# Patient Record
Sex: FEMALE | Race: WHITE | NOT HISPANIC OR LATINO | URBAN - NONMETROPOLITAN AREA
[De-identification: names, ages, dates, MRNs, and addresses within clinical notes are randomized per-mention and may not be internally consistent; named-entity substitution may affect disease eponyms.]

---

## 2022-02-14 LAB
AGE: 34
ALBUMIN SERPL-MCNC: 4.6 G/DL (ref 3.4–5)
ALBUMIN/GLOB SERPL: 1.6 {RATIO}
ALP SERPL-CCNC: 73 U/L (ref 50–144)
ALT SERPL-CCNC: 11 U/L (ref 1–45)
ANION GAP SERPL CALC-SCNC: 7 MMOL/L (ref 2–13)
AST SERPL-CCNC: 22 U/L (ref 14–36)
BASOPHILS # BLD AUTO: 0.03 10*3/UL (ref 0.01–0.08)
BASOPHILS NFR BLD AUTO: 0.5 % (ref 0.1–1.2)
BILIRUB SERPL-MCNC: 0.34 MG/DL (ref 0–1)
BUN SERPL-MCNC: 11 MG/DL (ref 7–20)
CALCIUM SERPL-MCNC: 9.5 MG/DL (ref 8.4–10.2)
CHLORIDE SERPL-SCNC: 106 MMOL/L (ref 94–110)
CHOLEST SERPL-MCNC: 169 MG/DL (ref 0–200)
CO2 SERPL-SCNC: 27 MMOL/L (ref 21–32)
CREAT SERPL-MCNC: 0.62 MG/DL (ref 0.52–1.04)
CREAT/UREA NIT SERPL: 17.7 (ref 12–20)
EOSINOPHIL # BLD AUTO: 0.21 10*3/UL (ref 0.04–0.36)
EOSINOPHIL NFR BLD AUTO: 3.2 % (ref 0.7–7)
ERYTHROCYTE [DISTWIDTH] IN BLOOD BY AUTOMATED COUNT: 11.1 % (ref 11–14.5)
EST. AVERAGE GLUCOSE BLD GHB EST-MCNC: 84 MG/DL (ref 70–115)
GLOBULIN SER-MCNC: 2.8 G/DL (ref 2–3.9)
GLUCOSE SERPL-MCNC: 83 MG/DL (ref 70–115)
HBA1C MFR BLD: 4.8 % (ref 4–6)
HCT VFR BLD AUTO: 37.1 % (ref 36–48)
HDLC SERPL-MCNC: 52 MG/DL (ref 40–60)
HGB BLD-MCNC: 13.5 G/DL (ref 11.8–16)
IMM GRANULOCYTES # BLD AUTO: 0.05 10*3/UL (ref 0–0.03)
IMM GRANULOCYTES NFR BLD AUTO: 0.8 % (ref 0–0.5)
LDLC SERPL CALC-MCNC: 88.6 MG/DL (ref 30–100)
LYMPHOCYTES # BLD AUTO: 1.86 10*3/UL (ref 1.16–3.74)
LYMPHOCYTES NFR BLD AUTO: 28 % (ref 20–55)
MANUAL DIFF? (OHS): NORMAL
MCH RBC QN AUTO: 32.6 PG (ref 27–34)
MCHC RBC AUTO-ENTMCNC: 36.4 G/DL (ref 31–37)
MCV RBC AUTO: 89.6 FL (ref 79–99)
MONOCYTES # BLD AUTO: 0.41 10*3/UL (ref 0.24–0.36)
MONOCYTES NFR BLD AUTO: 6.2 % (ref 4.7–12.5)
NEUTROPHILS # BLD AUTO: 4.09 10*3/UL (ref 1.56–6.13)
NEUTROPHILS NFR BLD AUTO: 61.3 % (ref 37–73)
PLATELET # BLD AUTO: 142 10*3/UL (ref 140–371)
PMV BLD AUTO: 9.7 FL (ref 9.4–12.4)
POTASSIUM SERPL-SCNC: 4.1 MMOL/L (ref 3.5–5.1)
PROT SERPL-MCNC: 7.4 G/DL (ref 6.3–8.2)
RBC # BLD AUTO: 4.14 10*6/UL (ref 4–5.1)
SODIUM SERPL-SCNC: 140 MMOL/L (ref 135–145)
TRIGL SERPL-MCNC: 89 MG/DL (ref 30–200)
TSH SERPL-ACNC: 1.11 M[IU]/L (ref 0.36–3.74)
WBC # SPEC AUTO: 6.7 10*3/UL (ref 4–11.5)

## 2022-02-18 ENCOUNTER — HISTORICAL (OUTPATIENT)
Dept: ADMINISTRATIVE | Facility: HOSPITAL | Age: 37
End: 2022-02-18

## 2022-05-14 ENCOUNTER — HISTORICAL (OUTPATIENT)
Dept: ADMINISTRATIVE | Facility: HOSPITAL | Age: 37
End: 2022-05-14

## 2023-01-24 DIAGNOSIS — F90.9 ATTENTION DEFICIT HYPERACTIVITY DISORDER (ADHD), UNSPECIFIED ADHD TYPE: Primary | ICD-10-CM

## 2023-01-24 RX ORDER — DEXTROAMPHETAMINE SACCHARATE, AMPHETAMINE ASPARTATE MONOHYDRATE, DEXTROAMPHETAMINE SULFATE AND AMPHETAMINE SULFATE 7.5; 7.5; 7.5; 7.5 MG/1; MG/1; MG/1; MG/1
30 CAPSULE, EXTENDED RELEASE ORAL EVERY MORNING
COMMUNITY
End: 2023-01-24 | Stop reason: SDUPTHER

## 2023-01-24 RX ORDER — DEXTROAMPHETAMINE SACCHARATE, AMPHETAMINE ASPARTATE MONOHYDRATE, DEXTROAMPHETAMINE SULFATE AND AMPHETAMINE SULFATE 7.5; 7.5; 7.5; 7.5 MG/1; MG/1; MG/1; MG/1
30 CAPSULE, EXTENDED RELEASE ORAL EVERY MORNING
Qty: 30 CAPSULE | Refills: 0 | Status: SHIPPED | OUTPATIENT
Start: 2023-01-24 | End: 2023-02-23

## 2023-02-23 DIAGNOSIS — F90.9 ATTENTION DEFICIT HYPERACTIVITY DISORDER (ADHD), UNSPECIFIED ADHD TYPE: Primary | ICD-10-CM

## 2023-02-23 RX ORDER — DEXTROAMPHETAMINE SACCHARATE, AMPHETAMINE ASPARTATE MONOHYDRATE, DEXTROAMPHETAMINE SULFATE AND AMPHETAMINE SULFATE 7.5; 7.5; 7.5; 7.5 MG/1; MG/1; MG/1; MG/1
30 CAPSULE, EXTENDED RELEASE ORAL EVERY MORNING
Qty: 30 CAPSULE | Refills: 0 | Status: SHIPPED | OUTPATIENT
Start: 2023-02-23 | End: 2023-02-28 | Stop reason: SDUPTHER

## 2023-02-23 RX ORDER — DEXTROAMPHETAMINE SACCHARATE, AMPHETAMINE ASPARTATE MONOHYDRATE, DEXTROAMPHETAMINE SULFATE AND AMPHETAMINE SULFATE 7.5; 7.5; 7.5; 7.5 MG/1; MG/1; MG/1; MG/1
30 CAPSULE, EXTENDED RELEASE ORAL EVERY MORNING
Qty: 30 CAPSULE | Refills: 0 | OUTPATIENT
Start: 2023-02-23

## 2023-02-23 RX ORDER — DEXTROAMPHETAMINE SACCHARATE, AMPHETAMINE ASPARTATE MONOHYDRATE, DEXTROAMPHETAMINE SULFATE AND AMPHETAMINE SULFATE 7.5; 7.5; 7.5; 7.5 MG/1; MG/1; MG/1; MG/1
30 CAPSULE, EXTENDED RELEASE ORAL EVERY MORNING
COMMUNITY
Start: 2022-12-22 | End: 2023-02-23 | Stop reason: SDUPTHER

## 2023-02-28 DIAGNOSIS — F90.9 ATTENTION DEFICIT HYPERACTIVITY DISORDER (ADHD), UNSPECIFIED ADHD TYPE: ICD-10-CM

## 2023-02-28 RX ORDER — DEXTROAMPHETAMINE SACCHARATE, AMPHETAMINE ASPARTATE MONOHYDRATE, DEXTROAMPHETAMINE SULFATE AND AMPHETAMINE SULFATE 7.5; 7.5; 7.5; 7.5 MG/1; MG/1; MG/1; MG/1
30 CAPSULE, EXTENDED RELEASE ORAL EVERY MORNING
Qty: 30 CAPSULE | Refills: 0 | Status: SHIPPED | OUTPATIENT
Start: 2023-02-28 | End: 2023-03-30 | Stop reason: SDUPTHER

## 2023-02-28 NOTE — TELEPHONE ENCOUNTER
----- Message from Leena Quick sent at 2/28/2023  9:23 AM CST -----  Patient called stating [facility] does not have Adderall so she cancelled script we sent to them and asked that we send her Adderall XR 30 mg to [facility].

## 2023-03-28 ENCOUNTER — TELEPHONE (OUTPATIENT)
Dept: FAMILY MEDICINE | Facility: CLINIC | Age: 38
End: 2023-03-28
Payer: MEDICAID

## 2023-03-29 NOTE — TELEPHONE ENCOUNTER
Explained to pt that her 3 month f/u for adderall is tomorrow with Sadaf. She will refill it at visit.

## 2023-03-29 NOTE — TELEPHONE ENCOUNTER
----- Message from Destiny Chauhan sent at 3/29/2023  3:18 PM CDT -----  Regarding: Med refill      She said she called for a refill yesterday and apparently it was not called in since the pharmacy does not have her meds ready.  I explained that it can take up to 48 hours to get a refill and depends on the providers schedule.    She wants to refill her Adderall XR  30mg          [facility]

## 2023-03-30 ENCOUNTER — OFFICE VISIT (OUTPATIENT)
Dept: FAMILY MEDICINE | Facility: CLINIC | Age: 38
End: 2023-03-30
Payer: MEDICAID

## 2023-03-30 VITALS
OXYGEN SATURATION: 97 % | BODY MASS INDEX: 39.2 KG/M2 | HEIGHT: 62 IN | HEART RATE: 101 BPM | WEIGHT: 213 LBS | DIASTOLIC BLOOD PRESSURE: 78 MMHG | TEMPERATURE: 98 F | RESPIRATION RATE: 20 BRPM | SYSTOLIC BLOOD PRESSURE: 108 MMHG

## 2023-03-30 DIAGNOSIS — T78.40XD ALLERGY, SUBSEQUENT ENCOUNTER: ICD-10-CM

## 2023-03-30 DIAGNOSIS — F90.9 ATTENTION DEFICIT HYPERACTIVITY DISORDER (ADHD), UNSPECIFIED ADHD TYPE: ICD-10-CM

## 2023-03-30 DIAGNOSIS — K21.9 GASTROESOPHAGEAL REFLUX DISEASE, UNSPECIFIED WHETHER ESOPHAGITIS PRESENT: Primary | ICD-10-CM

## 2023-03-30 DIAGNOSIS — F41.9 ANXIETY: ICD-10-CM

## 2023-03-30 PROCEDURE — 3074F PR MOST RECENT SYSTOLIC BLOOD PRESSURE < 130 MM HG: ICD-10-PCS | Mod: CPTII,,, | Performed by: NURSE PRACTITIONER

## 2023-03-30 PROCEDURE — 1159F MED LIST DOCD IN RCRD: CPT | Mod: CPTII,,, | Performed by: NURSE PRACTITIONER

## 2023-03-30 PROCEDURE — 3008F PR BODY MASS INDEX (BMI) DOCUMENTED: ICD-10-PCS | Mod: CPTII,,, | Performed by: NURSE PRACTITIONER

## 2023-03-30 PROCEDURE — 99213 PR OFFICE/OUTPT VISIT, EST, LEVL III, 20-29 MIN: ICD-10-PCS | Mod: ,,, | Performed by: NURSE PRACTITIONER

## 2023-03-30 PROCEDURE — 3078F DIAST BP <80 MM HG: CPT | Mod: CPTII,,, | Performed by: NURSE PRACTITIONER

## 2023-03-30 PROCEDURE — 3078F PR MOST RECENT DIASTOLIC BLOOD PRESSURE < 80 MM HG: ICD-10-PCS | Mod: CPTII,,, | Performed by: NURSE PRACTITIONER

## 2023-03-30 PROCEDURE — 99213 OFFICE O/P EST LOW 20 MIN: CPT | Mod: ,,, | Performed by: NURSE PRACTITIONER

## 2023-03-30 PROCEDURE — 3074F SYST BP LT 130 MM HG: CPT | Mod: CPTII,,, | Performed by: NURSE PRACTITIONER

## 2023-03-30 PROCEDURE — 3008F BODY MASS INDEX DOCD: CPT | Mod: CPTII,,, | Performed by: NURSE PRACTITIONER

## 2023-03-30 PROCEDURE — 1159F PR MEDICATION LIST DOCUMENTED IN MEDICAL RECORD: ICD-10-PCS | Mod: CPTII,,, | Performed by: NURSE PRACTITIONER

## 2023-03-30 RX ORDER — OMEPRAZOLE 40 MG/1
40 CAPSULE, DELAYED RELEASE ORAL DAILY
COMMUNITY
Start: 2023-02-28 | End: 2023-03-30 | Stop reason: SDUPTHER

## 2023-03-30 RX ORDER — OMEPRAZOLE 40 MG/1
40 CAPSULE, DELAYED RELEASE ORAL DAILY
Qty: 30 CAPSULE | Refills: 2 | Status: SHIPPED | OUTPATIENT
Start: 2023-03-30 | End: 2023-07-05 | Stop reason: SDUPTHER

## 2023-03-30 RX ORDER — CETIRIZINE HYDROCHLORIDE 10 MG/1
10 TABLET ORAL DAILY
Qty: 30 TABLET | Refills: 2 | Status: SHIPPED | OUTPATIENT
Start: 2023-03-30 | End: 2023-04-27

## 2023-03-30 RX ORDER — DEXTROAMPHETAMINE SACCHARATE, AMPHETAMINE ASPARTATE MONOHYDRATE, DEXTROAMPHETAMINE SULFATE AND AMPHETAMINE SULFATE 7.5; 7.5; 7.5; 7.5 MG/1; MG/1; MG/1; MG/1
30 CAPSULE, EXTENDED RELEASE ORAL EVERY MORNING
Qty: 30 CAPSULE | Refills: 0 | Status: SHIPPED | OUTPATIENT
Start: 2023-03-30 | End: 2023-04-27 | Stop reason: SDUPTHER

## 2023-03-30 RX ORDER — BUSPIRONE HYDROCHLORIDE 5 MG/1
5 TABLET ORAL 2 TIMES DAILY
Qty: 60 TABLET | Refills: 2 | Status: SHIPPED | OUTPATIENT
Start: 2023-03-30 | End: 2023-04-27

## 2023-03-30 NOTE — PROGRESS NOTES
SUBJECTIVE:  Melissa Young is a 37 y.o. female here for Follow-up (3 mth f/u on medication. States that medications are working well. Needing refills. States that she stopped Prozac due to medication ineffective./)      HPI  Presents to the clinic in follow-up.  Reports she is feeling good.  Stomach issues have improved since stopping Prozac as she did not feel the medication was helping much.    Melissa's allergies, medications, history, and problem list were updated as appropriate.    Review of Systems   Psychiatric/Behavioral:  Positive for decreased concentration and dysphoric mood.     A comprehensive review of symptoms was completed and negative except as noted above.    Recent Results (from the past 504 hour(s))   Comprehensive Metabolic Panel    Collection Time: 04/03/23  9:26 AM   Result Value Ref Range    Sodium Level 141 135 - 145 mmol/L    Potassium Level 4.2 3.5 - 5.1 mmol/L    Chloride 109 98 - 110 mmol/L    Carbon Dioxide 28 21 - 32 mmol/L    Glucose Level 92 70 - 115 mg/dL    Blood Urea Nitrogen 8.0 7.0 - 20.0 mg/dL    Creatinine 0.64 (L) 0.66 - 1.25 mg/dL    Calcium Level Total 8.8 8.4 - 10.2 mg/dL    Protein Total 6.8 6.3 - 8.2 gm/dL    Albumin Level 3.9 3.4 - 5.0 g/dL    Globulin 2.9 2.0 - 3.9 gm/dL    Albumin/Globulin Ratio 1.3 ratio    Bilirubin Total 0.4 0.0 - 1.0 mg/dL    Alkaline Phosphatase 108 50 - 144 unit/L    Alanine Aminotransferase 58 (H) 1 - 45 unit/L    Aspartate Aminotransferase 37 (H) 14 - 36 unit/L    eGFR >90 mls/min/1.73/m2    Anion Gap 4.0 2.0 - 13.0 mEq/L    BUN/Creatinine Ratio 13 12 - 20   Lipid Panel    Collection Time: 04/03/23  9:26 AM   Result Value Ref Range    Cholesterol Total 197 0 - 200 mg/dL    HDL Cholesterol 48 40 - 60 mg/dL    Triglyceride 135 30 - 200 mg/dL    LDL Cholesterol Direct 110.1 (H) 30.0 - 100.0 mg/dL   Vitamin D    Collection Time: 04/03/23  9:26 AM   Result Value Ref Range    Vit D 25 OH 20.2 (L) 30.0 - 100.0 ng/mL   Vitamin B12    Collection Time:  "04/03/23  9:26 AM   Result Value Ref Range    Vitamin B12 Level 361 211 - 946 pg/mL   TSH    Collection Time: 04/03/23  9:26 AM   Result Value Ref Range    Thyroid Stimulating Hormone 2.180 0.360 - 3.740 uIU/mL   CBC with Differential    Collection Time: 04/03/23  9:26 AM   Result Value Ref Range    WBC 7.0 4.0 - 11.5 x10(3)/mcL    RBC 3.84 (L) 4.00 - 5.10 x10(6)/mcL    Hgb 11.6 (L) 11.8 - 16.0 g/dL    Hct 34.7 (L) 36.0 - 48.0 %    MCV 90.4 79.0 - 99.0 fL    MCH 30.2 27.0 - 34.0 pg    MCHC 33.4 31.0 - 37.0 g/dL    RDW 12.6 11.0 - 14.5 %    Platelet 175 140 - 371 x10(3)/mcL    MPV 9.3 (L) 9.4 - 12.4 fL    IG# 0.07 (H) 0.0001 - 0.031 x10(3)/mcL    IG% 1.0 (H) 0 - 0.5 %   Blood Smear Microscopic Exam    Collection Time: 04/03/23  9:26 AM   Result Value Ref Range    Platelet Est Adequate Normal, Adequate       OBJECTIVE:  Vital signs  Vitals:    03/30/23 1018   BP: 108/78   BP Location: Right arm   Patient Position: Sitting   Pulse: 101   Resp: 20   Temp: 97.7 °F (36.5 °C)   SpO2: 97%   Weight: 96.6 kg (213 lb)   Height: 5' 2" (1.575 m)        Physical Exam  Constitutional:       Appearance: She is obese.   HENT:      Head: Normocephalic and atraumatic.      Nose: Nose normal.      Mouth/Throat:      Mouth: Mucous membranes are moist.      Pharynx: Oropharynx is clear.   Eyes:      Conjunctiva/sclera: Conjunctivae normal.   Cardiovascular:      Rate and Rhythm: Normal rate and regular rhythm.   Pulmonary:      Effort: Pulmonary effort is normal.      Breath sounds: Normal breath sounds.   Abdominal:      General: Bowel sounds are normal.      Palpations: Abdomen is soft.   Musculoskeletal:         General: Normal range of motion.      Cervical back: Normal range of motion and neck supple.   Skin:     General: Skin is warm.   Neurological:      Mental Status: She is alert.   Psychiatric:         Mood and Affect: Mood normal.         Behavior: Behavior normal.         Judgment: Judgment normal.        ASSESSMENT/PLAN:  1. " Gastroesophageal reflux disease, unspecified whether esophagitis present  -     omeprazole (PRILOSEC) 40 MG capsule; Take 1 capsule (40 mg total) by mouth once daily.  Dispense: 30 capsule; Refill: 2    2. Anxiety  -     busPIRone (BUSPAR) 5 MG Tab; Take 1 tablet (5 mg total) by mouth 2 (two) times daily.  Dispense: 60 tablet; Refill: 2    3. Allergy, subsequent encounter  -     cetirizine (ZYRTEC) 10 MG tablet; Take 1 tablet (10 mg total) by mouth once daily.  Dispense: 30 tablet; Refill: 2    4. Attention deficit hyperactivity disorder (ADHD), unspecified ADHD type  -     dextroamphetamine-amphetamine (ADDERALL XR) 30 MG 24 hr capsule; Take 1 capsule (30 mg total) by mouth every morning.  Dispense: 30 capsule; Refill: 0        Follow Up:  Follow up in about 3 months (around 6/30/2023).

## 2023-04-03 PROCEDURE — 80053 COMPREHEN METABOLIC PANEL: CPT | Performed by: NURSE PRACTITIONER

## 2023-04-03 PROCEDURE — 84443 ASSAY THYROID STIM HORMONE: CPT | Performed by: NURSE PRACTITIONER

## 2023-04-03 PROCEDURE — 82607 VITAMIN B-12: CPT | Performed by: NURSE PRACTITIONER

## 2023-04-03 PROCEDURE — 80061 LIPID PANEL: CPT | Performed by: NURSE PRACTITIONER

## 2023-04-03 PROCEDURE — 82306 VITAMIN D 25 HYDROXY: CPT | Performed by: NURSE PRACTITIONER

## 2023-04-03 PROCEDURE — 85025 COMPLETE CBC W/AUTO DIFF WBC: CPT | Performed by: NURSE PRACTITIONER

## 2023-04-27 DIAGNOSIS — F90.9 ATTENTION DEFICIT HYPERACTIVITY DISORDER (ADHD), UNSPECIFIED ADHD TYPE: ICD-10-CM

## 2023-04-27 DIAGNOSIS — T78.40XD ALLERGY, SUBSEQUENT ENCOUNTER: ICD-10-CM

## 2023-04-27 DIAGNOSIS — F41.9 ANXIETY: ICD-10-CM

## 2023-04-27 RX ORDER — DEXTROAMPHETAMINE SACCHARATE, AMPHETAMINE ASPARTATE MONOHYDRATE, DEXTROAMPHETAMINE SULFATE AND AMPHETAMINE SULFATE 7.5; 7.5; 7.5; 7.5 MG/1; MG/1; MG/1; MG/1
30 CAPSULE, EXTENDED RELEASE ORAL EVERY MORNING
Qty: 30 CAPSULE | Refills: 0 | Status: SHIPPED | OUTPATIENT
Start: 2023-04-27 | End: 2023-05-29 | Stop reason: SDUPTHER

## 2023-04-27 RX ORDER — CETIRIZINE HYDROCHLORIDE 10 MG/1
TABLET ORAL
Qty: 30 TABLET | Refills: 2 | Status: SHIPPED | OUTPATIENT
Start: 2023-04-27 | End: 2023-07-05 | Stop reason: SDUPTHER

## 2023-04-27 RX ORDER — BUSPIRONE HYDROCHLORIDE 5 MG/1
TABLET ORAL
Qty: 60 TABLET | Refills: 1 | Status: SHIPPED | OUTPATIENT
Start: 2023-04-27 | End: 2023-07-05 | Stop reason: SDUPTHER

## 2023-04-27 RX ORDER — FLUOXETINE HYDROCHLORIDE 40 MG/1
CAPSULE ORAL
Qty: 30 CAPSULE | Refills: 2 | Status: SHIPPED | OUTPATIENT
Start: 2023-04-27 | End: 2023-07-05 | Stop reason: SDDI

## 2023-05-29 DIAGNOSIS — F90.9 ATTENTION DEFICIT HYPERACTIVITY DISORDER (ADHD), UNSPECIFIED ADHD TYPE: ICD-10-CM

## 2023-05-29 RX ORDER — DEXTROAMPHETAMINE SACCHARATE, AMPHETAMINE ASPARTATE MONOHYDRATE, DEXTROAMPHETAMINE SULFATE AND AMPHETAMINE SULFATE 7.5; 7.5; 7.5; 7.5 MG/1; MG/1; MG/1; MG/1
30 CAPSULE, EXTENDED RELEASE ORAL EVERY MORNING
Qty: 30 CAPSULE | Refills: 0 | Status: SHIPPED | OUTPATIENT
Start: 2023-05-29 | End: 2023-07-05 | Stop reason: SDUPTHER

## 2023-07-05 ENCOUNTER — TELEPHONE (OUTPATIENT)
Dept: OBSTETRICS AND GYNECOLOGY | Facility: CLINIC | Age: 38
End: 2023-07-05
Payer: MEDICAID

## 2023-07-05 ENCOUNTER — OFFICE VISIT (OUTPATIENT)
Dept: FAMILY MEDICINE | Facility: CLINIC | Age: 38
End: 2023-07-05
Payer: MEDICAID

## 2023-07-05 VITALS
TEMPERATURE: 97 F | WEIGHT: 198 LBS | OXYGEN SATURATION: 97 % | DIASTOLIC BLOOD PRESSURE: 70 MMHG | HEART RATE: 90 BPM | SYSTOLIC BLOOD PRESSURE: 118 MMHG | BODY MASS INDEX: 36.44 KG/M2 | HEIGHT: 62 IN | RESPIRATION RATE: 18 BRPM

## 2023-07-05 DIAGNOSIS — T78.40XD ALLERGY, SUBSEQUENT ENCOUNTER: ICD-10-CM

## 2023-07-05 DIAGNOSIS — F90.9 ATTENTION DEFICIT HYPERACTIVITY DISORDER (ADHD), UNSPECIFIED ADHD TYPE: Primary | ICD-10-CM

## 2023-07-05 DIAGNOSIS — F41.9 ANXIETY: ICD-10-CM

## 2023-07-05 DIAGNOSIS — Z01.419 WOMEN'S ANNUAL ROUTINE GYNECOLOGICAL EXAMINATION: ICD-10-CM

## 2023-07-05 DIAGNOSIS — K21.9 GASTROESOPHAGEAL REFLUX DISEASE, UNSPECIFIED WHETHER ESOPHAGITIS PRESENT: ICD-10-CM

## 2023-07-05 PROCEDURE — 3008F BODY MASS INDEX DOCD: CPT | Mod: CPTII,,, | Performed by: NURSE PRACTITIONER

## 2023-07-05 PROCEDURE — 99213 PR OFFICE/OUTPT VISIT, EST, LEVL III, 20-29 MIN: ICD-10-PCS | Mod: ,,, | Performed by: NURSE PRACTITIONER

## 2023-07-05 PROCEDURE — 3078F DIAST BP <80 MM HG: CPT | Mod: CPTII,,, | Performed by: NURSE PRACTITIONER

## 2023-07-05 PROCEDURE — 1159F PR MEDICATION LIST DOCUMENTED IN MEDICAL RECORD: ICD-10-PCS | Mod: CPTII,,, | Performed by: NURSE PRACTITIONER

## 2023-07-05 PROCEDURE — 3008F PR BODY MASS INDEX (BMI) DOCUMENTED: ICD-10-PCS | Mod: CPTII,,, | Performed by: NURSE PRACTITIONER

## 2023-07-05 PROCEDURE — 99213 OFFICE O/P EST LOW 20 MIN: CPT | Mod: ,,, | Performed by: NURSE PRACTITIONER

## 2023-07-05 PROCEDURE — 3074F PR MOST RECENT SYSTOLIC BLOOD PRESSURE < 130 MM HG: ICD-10-PCS | Mod: CPTII,,, | Performed by: NURSE PRACTITIONER

## 2023-07-05 PROCEDURE — 1159F MED LIST DOCD IN RCRD: CPT | Mod: CPTII,,, | Performed by: NURSE PRACTITIONER

## 2023-07-05 PROCEDURE — 3074F SYST BP LT 130 MM HG: CPT | Mod: CPTII,,, | Performed by: NURSE PRACTITIONER

## 2023-07-05 PROCEDURE — 3078F PR MOST RECENT DIASTOLIC BLOOD PRESSURE < 80 MM HG: ICD-10-PCS | Mod: CPTII,,, | Performed by: NURSE PRACTITIONER

## 2023-07-05 RX ORDER — DEXTROAMPHETAMINE SACCHARATE, AMPHETAMINE ASPARTATE MONOHYDRATE, DEXTROAMPHETAMINE SULFATE AND AMPHETAMINE SULFATE 7.5; 7.5; 7.5; 7.5 MG/1; MG/1; MG/1; MG/1
30 CAPSULE, EXTENDED RELEASE ORAL EVERY MORNING
Qty: 30 CAPSULE | Refills: 0 | Status: SHIPPED | OUTPATIENT
Start: 2023-07-05 | End: 2023-08-02 | Stop reason: SDUPTHER

## 2023-07-05 RX ORDER — CETIRIZINE HYDROCHLORIDE 10 MG/1
10 TABLET ORAL NIGHTLY
Qty: 30 TABLET | Refills: 2 | Status: SHIPPED | OUTPATIENT
Start: 2023-07-05 | End: 2023-11-06 | Stop reason: SDUPTHER

## 2023-07-05 RX ORDER — OMEPRAZOLE 40 MG/1
40 CAPSULE, DELAYED RELEASE ORAL DAILY
Qty: 30 CAPSULE | Refills: 2 | Status: SHIPPED | OUTPATIENT
Start: 2023-07-05 | End: 2023-11-06 | Stop reason: SDUPTHER

## 2023-07-05 RX ORDER — HYDROXYZINE HYDROCHLORIDE 25 MG/1
25 TABLET, FILM COATED ORAL 4 TIMES DAILY PRN
COMMUNITY
Start: 2023-01-24 | End: 2023-07-05 | Stop reason: SDDI

## 2023-07-05 RX ORDER — BUSPIRONE HYDROCHLORIDE 5 MG/1
5 TABLET ORAL 2 TIMES DAILY
Qty: 60 TABLET | Refills: 2 | Status: SHIPPED | OUTPATIENT
Start: 2023-07-05 | End: 2023-10-05 | Stop reason: SDUPTHER

## 2023-07-05 NOTE — PROGRESS NOTES
"SUBJECTIVE:  Melissa Young is a 37 y.o. female here for 3 month f/u meds/ADHD      HPI  Presents to the clinic in follow-up.  Reports she is doing well.  Her anxiety and ADHD are under control.  She has been watching her diet and lost 15 lbs since her last visit.  GI issues are stable.    Kamis allergies, medications, history, and problem list were updated as appropriate.    Review of Systems   Psychiatric/Behavioral:  Positive for decreased concentration and dysphoric mood.     A comprehensive review of symptoms was completed and negative except as noted above.    No results found for this or any previous visit (from the past 504 hour(s)).    OBJECTIVE:  Vital signs  Vitals:    07/05/23 1041   BP: 118/70   BP Location: Left arm   Patient Position: Sitting   Pulse: 90   Resp: 18   Temp: 97.2 °F (36.2 °C)   TempSrc: Temporal   SpO2: 97%   Weight: 89.8 kg (198 lb)   Height: 5' 2" (1.575 m)        Physical Exam  Constitutional:       Appearance: Normal appearance.   HENT:      Head: Normocephalic and atraumatic.      Right Ear: Tympanic membrane, ear canal and external ear normal.      Left Ear: Tympanic membrane, ear canal and external ear normal.      Nose: Nose normal.      Mouth/Throat:      Mouth: Mucous membranes are moist.      Pharynx: Oropharynx is clear.   Eyes:      Conjunctiva/sclera: Conjunctivae normal.   Cardiovascular:      Rate and Rhythm: Normal rate and regular rhythm.   Pulmonary:      Effort: Pulmonary effort is normal.      Breath sounds: Normal breath sounds.   Abdominal:      General: Bowel sounds are normal.      Palpations: Abdomen is soft.   Musculoskeletal:         General: Normal range of motion.      Cervical back: Normal range of motion and neck supple.   Skin:     General: Skin is warm.      Capillary Refill: Capillary refill takes less than 2 seconds.   Neurological:      Mental Status: She is alert.   Psychiatric:         Mood and Affect: Mood normal.         Behavior: Behavior " normal.         Judgment: Judgment normal.        ASSESSMENT/PLAN:  1. Attention deficit hyperactivity disorder (ADHD), unspecified ADHD type  -     dextroamphetamine-amphetamine (ADDERALL XR) 30 MG 24 hr capsule; Take 1 capsule (30 mg total) by mouth every morning.  Dispense: 30 capsule; Refill: 0    2. Women's annual routine gynecological examination  -     Ambulatory referral/consult to Gynecology; Future; Expected date: 07/12/2023    3. Anxiety  -     busPIRone (BUSPAR) 5 MG Tab; Take 1 tablet (5 mg total) by mouth 2 (two) times daily.  Dispense: 60 tablet; Refill: 2    4. Allergy, subsequent encounter  -     cetirizine (ZYRTEC) 10 MG tablet; Take 1 tablet (10 mg total) by mouth every evening.  Dispense: 30 tablet; Refill: 2    5. Gastroesophageal reflux disease, unspecified whether esophagitis present  -     omeprazole (PRILOSEC) 40 MG capsule; Take 1 capsule (40 mg total) by mouth once daily.  Dispense: 30 capsule; Refill: 2        Follow Up:  Follow up in about 3 months (around 10/5/2023).

## 2023-08-02 DIAGNOSIS — F90.9 ATTENTION DEFICIT HYPERACTIVITY DISORDER (ADHD), UNSPECIFIED ADHD TYPE: ICD-10-CM

## 2023-08-02 RX ORDER — DEXTROAMPHETAMINE SACCHARATE, AMPHETAMINE ASPARTATE MONOHYDRATE, DEXTROAMPHETAMINE SULFATE AND AMPHETAMINE SULFATE 7.5; 7.5; 7.5; 7.5 MG/1; MG/1; MG/1; MG/1
30 CAPSULE, EXTENDED RELEASE ORAL EVERY MORNING
Qty: 30 CAPSULE | Refills: 0 | Status: SHIPPED | OUTPATIENT
Start: 2023-08-02 | End: 2023-09-06

## 2023-08-02 NOTE — TELEPHONE ENCOUNTER
----- Message from Destiny Chauhan sent at 8/2/2023  4:32 PM CDT -----  Regarding: Med refill      She needs a refill on her Adderall XR  30mg        [facility] [address]

## 2023-09-05 DIAGNOSIS — F90.9 ATTENTION DEFICIT HYPERACTIVITY DISORDER (ADHD), UNSPECIFIED ADHD TYPE: ICD-10-CM

## 2023-09-05 RX ORDER — DEXTROAMPHETAMINE SACCHARATE, AMPHETAMINE ASPARTATE MONOHYDRATE, DEXTROAMPHETAMINE SULFATE AND AMPHETAMINE SULFATE 7.5; 7.5; 7.5; 7.5 MG/1; MG/1; MG/1; MG/1
30 CAPSULE, EXTENDED RELEASE ORAL EVERY MORNING
Qty: 30 CAPSULE | Refills: 0 | OUTPATIENT
Start: 2023-09-05

## 2023-09-05 NOTE — TELEPHONE ENCOUNTER
----- Message from Jennie Blake MA sent at 8/31/2023  4:47 PM CDT -----  Sadaf       Adderall 30mg       [facility] [address]

## 2023-09-06 RX ORDER — DEXTROAMPHETAMINE SULFATE, DEXTROAMPHETAMINE SACCHARATE, AMPHETAMINE SULFATE AND AMPHETAMINE ASPARTATE 7.5; 7.5; 7.5; 7.5 MG/1; MG/1; MG/1; MG/1
CAPSULE, EXTENDED RELEASE ORAL
Qty: 30 CAPSULE | Refills: 0 | Status: SHIPPED | OUTPATIENT
Start: 2023-09-06 | End: 2023-10-05 | Stop reason: SDUPTHER

## 2023-09-28 RX ORDER — FLUOXETINE HYDROCHLORIDE 40 MG/1
40 CAPSULE ORAL DAILY
COMMUNITY
Start: 2023-08-03 | End: 2023-10-05 | Stop reason: ALTCHOICE

## 2023-10-05 ENCOUNTER — OFFICE VISIT (OUTPATIENT)
Dept: FAMILY MEDICINE | Facility: CLINIC | Age: 38
End: 2023-10-05
Payer: MEDICAID

## 2023-10-05 VITALS
HEART RATE: 91 BPM | SYSTOLIC BLOOD PRESSURE: 126 MMHG | DIASTOLIC BLOOD PRESSURE: 76 MMHG | TEMPERATURE: 97 F | OXYGEN SATURATION: 99 % | RESPIRATION RATE: 18 BRPM | BODY MASS INDEX: 34.04 KG/M2 | HEIGHT: 62 IN | WEIGHT: 185 LBS

## 2023-10-05 DIAGNOSIS — F41.9 ANXIETY: ICD-10-CM

## 2023-10-05 DIAGNOSIS — F90.9 ATTENTION DEFICIT HYPERACTIVITY DISORDER (ADHD), UNSPECIFIED ADHD TYPE: ICD-10-CM

## 2023-10-05 PROCEDURE — 1159F MED LIST DOCD IN RCRD: CPT | Mod: CPTII,,, | Performed by: NURSE PRACTITIONER

## 2023-10-05 PROCEDURE — 3074F PR MOST RECENT SYSTOLIC BLOOD PRESSURE < 130 MM HG: ICD-10-PCS | Mod: CPTII,,, | Performed by: NURSE PRACTITIONER

## 2023-10-05 PROCEDURE — 3008F PR BODY MASS INDEX (BMI) DOCUMENTED: ICD-10-PCS | Mod: CPTII,,, | Performed by: NURSE PRACTITIONER

## 2023-10-05 PROCEDURE — 3074F SYST BP LT 130 MM HG: CPT | Mod: CPTII,,, | Performed by: NURSE PRACTITIONER

## 2023-10-05 PROCEDURE — 3008F BODY MASS INDEX DOCD: CPT | Mod: CPTII,,, | Performed by: NURSE PRACTITIONER

## 2023-10-05 PROCEDURE — 3078F DIAST BP <80 MM HG: CPT | Mod: CPTII,,, | Performed by: NURSE PRACTITIONER

## 2023-10-05 PROCEDURE — 3078F PR MOST RECENT DIASTOLIC BLOOD PRESSURE < 80 MM HG: ICD-10-PCS | Mod: CPTII,,, | Performed by: NURSE PRACTITIONER

## 2023-10-05 PROCEDURE — 99213 OFFICE O/P EST LOW 20 MIN: CPT | Mod: ,,, | Performed by: NURSE PRACTITIONER

## 2023-10-05 PROCEDURE — 99213 PR OFFICE/OUTPT VISIT, EST, LEVL III, 20-29 MIN: ICD-10-PCS | Mod: ,,, | Performed by: NURSE PRACTITIONER

## 2023-10-05 PROCEDURE — 1159F PR MEDICATION LIST DOCUMENTED IN MEDICAL RECORD: ICD-10-PCS | Mod: CPTII,,, | Performed by: NURSE PRACTITIONER

## 2023-10-05 RX ORDER — DEXTROAMPHETAMINE SULFATE, DEXTROAMPHETAMINE SACCHARATE, AMPHETAMINE SULFATE AND AMPHETAMINE ASPARTATE 7.5; 7.5; 7.5; 7.5 MG/1; MG/1; MG/1; MG/1
30 CAPSULE, EXTENDED RELEASE ORAL EVERY MORNING
Qty: 30 CAPSULE | Refills: 0 | Status: SHIPPED | OUTPATIENT
Start: 2023-10-05 | End: 2023-11-06 | Stop reason: SDUPTHER

## 2023-10-05 RX ORDER — BUSPIRONE HYDROCHLORIDE 5 MG/1
5 TABLET ORAL 2 TIMES DAILY
Qty: 60 TABLET | Refills: 2 | Status: SHIPPED | OUTPATIENT
Start: 2023-10-05 | End: 2023-11-06 | Stop reason: SDUPTHER

## 2023-10-05 NOTE — PROGRESS NOTES
"SUBJECTIVE:  Melissa Young is a 38 y.o. female here for 3 month f/u ADD      HPI  Presents to the clinic in follow-up for ADD.  She aslo has anxiety, but choses snot to take SSRIs.  Buspirone works for her when needed.  Denies an problems at this time.  Kamis allergies, medications, history, and problem list were updated as appropriate.    Review of Systems   Constitutional:  Negative for activity change and unexpected weight change.   HENT:  Negative for hearing loss, rhinorrhea and trouble swallowing.    Eyes:  Negative for discharge and visual disturbance.   Respiratory:  Negative for chest tightness and wheezing.    Cardiovascular:  Negative for chest pain and palpitations.   Gastrointestinal:  Negative for blood in stool, constipation, diarrhea and vomiting.   Endocrine: Negative for polydipsia and polyuria.   Genitourinary:  Negative for difficulty urinating, dysuria, hematuria and menstrual problem.   Musculoskeletal:  Negative for arthralgias, joint swelling and neck pain.   Neurological:  Negative for weakness and headaches.   Psychiatric/Behavioral:  Negative for confusion and dysphoric mood.       A comprehensive review of symptoms was completed and negative except as noted above.    No results found for this or any previous visit (from the past 504 hour(s)).    OBJECTIVE:  Vital signs  Vitals:    10/05/23 1030   BP: 126/76   BP Location: Right arm   Patient Position: Sitting   Pulse: 91   Resp: 18   Temp: 97.4 °F (36.3 °C)   TempSrc: Temporal   SpO2: 99%   Weight: 83.9 kg (185 lb)   Height: 5' 2" (1.575 m)        Physical Exam  Constitutional:       Appearance: Normal appearance.   HENT:      Head: Normocephalic and atraumatic.      Nose: Nose normal.      Mouth/Throat:      Mouth: Mucous membranes are moist.      Pharynx: Oropharynx is clear.   Eyes:      Conjunctiva/sclera: Conjunctivae normal.   Cardiovascular:      Rate and Rhythm: Normal rate and regular rhythm.   Pulmonary:      Effort: " Pulmonary effort is normal.      Breath sounds: Normal breath sounds.   Abdominal:      General: Bowel sounds are normal.      Palpations: Abdomen is soft.   Musculoskeletal:         General: Normal range of motion.      Cervical back: Normal range of motion and neck supple.   Skin:     General: Skin is warm.      Capillary Refill: Capillary refill takes less than 2 seconds.   Neurological:      Mental Status: She is alert.   Psychiatric:         Mood and Affect: Mood normal.         Behavior: Behavior normal.         Judgment: Judgment normal.          ASSESSMENT/PLAN:  1. Anxiety  -     busPIRone (BUSPAR) 5 MG Tab; Take 1 tablet (5 mg total) by mouth 2 (two) times daily.  Dispense: 60 tablet; Refill: 2    2. Attention deficit hyperactivity disorder (ADHD), unspecified ADHD type  -     ADDERALL XR 30 mg 24 hr capsule; Take 1 capsule (30 mg total) by mouth every morning.  Dispense: 30 capsule; Refill: 0    Instructed to make AM appt and come fasting to next appt for labs.    Follow Up:  Follow up in about 3 months (around 1/5/2024).

## 2023-11-06 DIAGNOSIS — T78.40XD ALLERGY, SUBSEQUENT ENCOUNTER: ICD-10-CM

## 2023-11-06 DIAGNOSIS — K21.9 GASTROESOPHAGEAL REFLUX DISEASE, UNSPECIFIED WHETHER ESOPHAGITIS PRESENT: ICD-10-CM

## 2023-11-06 DIAGNOSIS — F41.9 ANXIETY: ICD-10-CM

## 2023-11-06 DIAGNOSIS — F90.9 ATTENTION DEFICIT HYPERACTIVITY DISORDER (ADHD), UNSPECIFIED ADHD TYPE: ICD-10-CM

## 2023-11-06 RX ORDER — CETIRIZINE HYDROCHLORIDE 10 MG/1
10 TABLET ORAL NIGHTLY
Qty: 30 TABLET | Refills: 2 | Status: SHIPPED | OUTPATIENT
Start: 2023-11-06 | End: 2023-12-07 | Stop reason: SDUPTHER

## 2023-11-06 RX ORDER — DEXTROAMPHETAMINE SULFATE, DEXTROAMPHETAMINE SACCHARATE, AMPHETAMINE SULFATE AND AMPHETAMINE ASPARTATE 7.5; 7.5; 7.5; 7.5 MG/1; MG/1; MG/1; MG/1
30 CAPSULE, EXTENDED RELEASE ORAL EVERY MORNING
Qty: 30 CAPSULE | Refills: 0 | Status: SHIPPED | OUTPATIENT
Start: 2023-11-06 | End: 2023-12-07 | Stop reason: SDUPTHER

## 2023-11-06 RX ORDER — BUSPIRONE HYDROCHLORIDE 5 MG/1
5 TABLET ORAL 2 TIMES DAILY
Qty: 60 TABLET | Refills: 2 | Status: SHIPPED | OUTPATIENT
Start: 2023-11-06 | End: 2023-12-07 | Stop reason: SDUPTHER

## 2023-11-06 RX ORDER — OMEPRAZOLE 40 MG/1
40 CAPSULE, DELAYED RELEASE ORAL DAILY
Qty: 30 CAPSULE | Refills: 2 | Status: SHIPPED | OUTPATIENT
Start: 2023-11-06 | End: 2023-12-07 | Stop reason: SDUPTHER

## 2023-11-06 NOTE — TELEPHONE ENCOUNTER
----- Message from Destiny Chauhan sent at 11/6/2023  8:26 AM CST -----  Regarding: Med refill  She needs a refill on her:  Adderall XR  30mg  Omeprazole  40mg  Buspirone  5mg  Cetirizine  10mg      [facility]

## 2023-12-07 DIAGNOSIS — F90.9 ATTENTION DEFICIT HYPERACTIVITY DISORDER (ADHD), UNSPECIFIED ADHD TYPE: ICD-10-CM

## 2023-12-07 DIAGNOSIS — K21.9 GASTROESOPHAGEAL REFLUX DISEASE, UNSPECIFIED WHETHER ESOPHAGITIS PRESENT: ICD-10-CM

## 2023-12-07 DIAGNOSIS — T78.40XD ALLERGY, SUBSEQUENT ENCOUNTER: ICD-10-CM

## 2023-12-07 DIAGNOSIS — F41.9 ANXIETY: ICD-10-CM

## 2023-12-07 RX ORDER — BUSPIRONE HYDROCHLORIDE 5 MG/1
5 TABLET ORAL 2 TIMES DAILY
Qty: 60 TABLET | Refills: 2 | Status: SHIPPED | OUTPATIENT
Start: 2023-12-07 | End: 2024-02-05 | Stop reason: SDUPTHER

## 2023-12-07 RX ORDER — DEXTROAMPHETAMINE SULFATE, DEXTROAMPHETAMINE SACCHARATE, AMPHETAMINE SULFATE AND AMPHETAMINE ASPARTATE 7.5; 7.5; 7.5; 7.5 MG/1; MG/1; MG/1; MG/1
30 CAPSULE, EXTENDED RELEASE ORAL EVERY MORNING
Qty: 30 CAPSULE | Refills: 0 | Status: SHIPPED | OUTPATIENT
Start: 2023-12-07 | End: 2024-01-04 | Stop reason: SDUPTHER

## 2023-12-07 RX ORDER — CETIRIZINE HYDROCHLORIDE 10 MG/1
10 TABLET ORAL NIGHTLY
Qty: 30 TABLET | Refills: 2 | Status: SHIPPED | OUTPATIENT
Start: 2023-12-07 | End: 2024-02-05 | Stop reason: SDUPTHER

## 2023-12-07 RX ORDER — OMEPRAZOLE 40 MG/1
40 CAPSULE, DELAYED RELEASE ORAL DAILY
Qty: 30 CAPSULE | Refills: 2 | Status: SHIPPED | OUTPATIENT
Start: 2023-12-07 | End: 2024-01-04

## 2023-12-07 NOTE — TELEPHONE ENCOUNTER
----- Message from Leena Quick sent at 12/7/2023  3:23 PM CST -----  Patient needs refills on Adderall XR 30 mg, Cetirizine 10 mg, Buspirone 5 mg and Omeprozole 40 mg called in to [facility]

## 2024-01-03 NOTE — PROGRESS NOTES
"SUBJECTIVE:  Melissa Young is a 38 y.o. female here alone for Follow-up      HPI  Patient presents for 3 month follow up on chronic conditions. Due for pap. Patient has not scheduled pap. Needs HIV screening. Stopped taking Omeprazole due to blotchy skin(purple and blue) and cold. Stopped Buspar about 1 week ago and started taking Hydroxyzine nightly for panic attacks.     Kamis allergies, medications, history, and problem list were updated as appropriate.    Review of Systems   Psychiatric/Behavioral:  Positive for decreased concentration. The patient is nervous/anxious.       A comprehensive review of symptoms was completed and negative except as noted above.    OBJECTIVE:  Vital signs  Vitals:    01/04/24 0759   BP: 128/76   BP Location: Right arm   Patient Position: Sitting   Pulse: 109   Resp: 18   Temp: 96.5 °F (35.8 °C)   TempSrc: Temporal   SpO2: 98%   Weight: 83 kg (183 lb)   Height: 5' 2" (1.575 m)        Physical Exam  Constitutional:       Appearance: Normal appearance.   HENT:      Head: Normocephalic and atraumatic.      Right Ear: Tympanic membrane, ear canal and external ear normal.      Left Ear: Tympanic membrane, ear canal and external ear normal.      Nose: Nose normal.      Mouth/Throat:      Mouth: Mucous membranes are moist.      Pharynx: Oropharynx is clear.   Eyes:      Conjunctiva/sclera: Conjunctivae normal.   Cardiovascular:      Rate and Rhythm: Normal rate and regular rhythm.   Pulmonary:      Effort: Pulmonary effort is normal.      Breath sounds: Normal breath sounds.   Abdominal:      General: Bowel sounds are normal.      Palpations: Abdomen is soft.   Skin:     General: Skin is warm.      Capillary Refill: Capillary refill takes less than 2 seconds.   Neurological:      Mental Status: She is alert.   Psychiatric:         Mood and Affect: Mood is anxious.         Behavior: Behavior normal.         Judgment: Judgment is impulsive.         ASSESSMENT/PLAN:    1. Environmental and " seasonal allergies  -     fluticasone propionate (FLONASE) 50 mcg/actuation nasal spray; 1 spray (50 mcg total) by Each Nostril route once daily.  Dispense: 16 g; Refill: 2    2. Anxiety  -     hydrOXYzine pamoate (VISTARIL) 25 MG Cap; Take 1 capsule (25 mg total) by mouth every 8 (eight) hours as needed (anxiety).  Dispense: 90 capsule; Refill: 1    3. Screening for HIV (human immunodeficiency virus)  -     HIV 1/2 Ag/Ab (4th Gen)    4. Long term current use of therapeutic drug  -     CBC Auto Differential  -     Comprehensive Metabolic Panel  -     TSH    5. Vitamin D deficiency  -     Vitamin D    6. Elevated LDL cholesterol level  -     Lipid Panel    7. Attention deficit hyperactivity disorder (ADHD), unspecified ADHD type  -     ADDERALL XR 30 mg 24 hr capsule; Take 1 capsule (30 mg total) by mouth every morning.  Dispense: 30 capsule; Refill: 0         No results found for this or any previous visit (from the past 24 hour(s)).    Follow Up:  Follow up in about 3 months (around 4/4/2024).      Discussed the diagnosis, prognosis, plan of care, chronic nature of and indications for, risks and benefits of treatment for conditions.  Continue all medications as prescribed unless otherwise noted.   Call if patient develops other symptoms or if not better in 2-3 days and sooner if worse. Emphasized the importance of compliance with all recommendations, including medication use and timely follow up. Instructed to return as noted be or sooner if patient develops any other problems or if there are any other additional questions or concerns.

## 2024-01-04 ENCOUNTER — OFFICE VISIT (OUTPATIENT)
Dept: FAMILY MEDICINE | Facility: CLINIC | Age: 39
End: 2024-01-04
Payer: MEDICAID

## 2024-01-04 VITALS
WEIGHT: 183 LBS | BODY MASS INDEX: 33.68 KG/M2 | TEMPERATURE: 97 F | HEART RATE: 109 BPM | DIASTOLIC BLOOD PRESSURE: 76 MMHG | SYSTOLIC BLOOD PRESSURE: 128 MMHG | HEIGHT: 62 IN | OXYGEN SATURATION: 98 % | RESPIRATION RATE: 18 BRPM

## 2024-01-04 DIAGNOSIS — E78.00 ELEVATED LDL CHOLESTEROL LEVEL: ICD-10-CM

## 2024-01-04 DIAGNOSIS — F41.9 ANXIETY: ICD-10-CM

## 2024-01-04 DIAGNOSIS — F90.9 ATTENTION DEFICIT HYPERACTIVITY DISORDER (ADHD), UNSPECIFIED ADHD TYPE: ICD-10-CM

## 2024-01-04 DIAGNOSIS — Z11.4 SCREENING FOR HIV (HUMAN IMMUNODEFICIENCY VIRUS): ICD-10-CM

## 2024-01-04 DIAGNOSIS — Z79.899 LONG TERM CURRENT USE OF THERAPEUTIC DRUG: ICD-10-CM

## 2024-01-04 DIAGNOSIS — J30.89 ENVIRONMENTAL AND SEASONAL ALLERGIES: Primary | ICD-10-CM

## 2024-01-04 DIAGNOSIS — E55.9 VITAMIN D DEFICIENCY: ICD-10-CM

## 2024-01-04 LAB
ALBUMIN SERPL-MCNC: 4.6 G/DL (ref 3.4–5)
ALBUMIN/GLOB SERPL: 1.5 RATIO
ALP SERPL-CCNC: 113 UNIT/L (ref 50–144)
ALT SERPL-CCNC: 23 UNIT/L (ref 1–45)
ANION GAP SERPL CALC-SCNC: 8 MEQ/L (ref 2–13)
AST SERPL-CCNC: 32 UNIT/L (ref 14–36)
BASOPHILS # BLD AUTO: 0.05 X10(3)/MCL (ref 0.01–0.08)
BASOPHILS NFR BLD AUTO: 0.6 % (ref 0.1–1.2)
BILIRUB SERPL-MCNC: 0.4 MG/DL (ref 0–1)
BUN SERPL-MCNC: 7 MG/DL (ref 7–20)
CALCIUM SERPL-MCNC: 9.2 MG/DL (ref 8.4–10.2)
CHLORIDE SERPL-SCNC: 104 MMOL/L (ref 98–110)
CHOLEST SERPL-MCNC: 197 MG/DL (ref 0–200)
CO2 SERPL-SCNC: 27 MMOL/L (ref 21–32)
CREAT SERPL-MCNC: 0.78 MG/DL (ref 0.66–1.25)
CREAT/UREA NIT SERPL: 9 (ref 12–20)
EOSINOPHIL # BLD AUTO: 0.12 X10(3)/MCL (ref 0.04–0.36)
EOSINOPHIL NFR BLD AUTO: 1.3 % (ref 0.7–7)
ERYTHROCYTE [DISTWIDTH] IN BLOOD BY AUTOMATED COUNT: 11.7 % (ref 11–14.5)
GFR SERPLBLD CREATININE-BSD FMLA CKD-EPI: >90 MLS/MIN/1.73/M2
GLOBULIN SER-MCNC: 3 GM/DL (ref 2–3.9)
GLUCOSE SERPL-MCNC: 99 MG/DL (ref 70–115)
HCT VFR BLD AUTO: 37.1 % (ref 36–48)
HDLC SERPL-MCNC: 63 MG/DL (ref 40–60)
HGB BLD-MCNC: 12.9 G/DL (ref 11.8–16)
IMM GRANULOCYTES # BLD AUTO: 0.06 X10(3)/MCL (ref 0–0.03)
IMM GRANULOCYTES NFR BLD AUTO: 0.7 % (ref 0–0.5)
LDLC SERPL DIRECT ASSAY-SCNC: 100.1 MG/DL (ref 30–100)
LYMPHOCYTES # BLD AUTO: 2.67 X10(3)/MCL (ref 1.16–3.74)
LYMPHOCYTES NFR BLD AUTO: 29.5 % (ref 20–55)
MCH RBC QN AUTO: 31.9 PG (ref 27–34)
MCHC RBC AUTO-ENTMCNC: 34.8 G/DL (ref 31–37)
MCV RBC AUTO: 91.8 FL (ref 79–99)
MONOCYTES # BLD AUTO: 0.62 X10(3)/MCL (ref 0.24–0.36)
MONOCYTES NFR BLD AUTO: 6.9 % (ref 4.7–12.5)
NEUTROPHILS # BLD AUTO: 5.52 X10(3)/MCL (ref 1.56–6.13)
NEUTROPHILS NFR BLD AUTO: 61 % (ref 37–73)
NRBC BLD AUTO-RTO: 0 %
PLATELET # BLD AUTO: 165 X10(3)/MCL (ref 140–371)
PMV BLD AUTO: 10.3 FL (ref 9.4–12.4)
POTASSIUM SERPL-SCNC: 3.7 MMOL/L (ref 3.5–5.1)
PROT SERPL-MCNC: 7.6 GM/DL (ref 6.3–8.2)
RBC # BLD AUTO: 4.04 X10(6)/MCL (ref 4–5.1)
SODIUM SERPL-SCNC: 139 MMOL/L (ref 135–145)
TRIGL SERPL-MCNC: 154 MG/DL (ref 30–200)
TSH SERPL-ACNC: 0.81 UIU/ML (ref 0.36–3.74)
WBC # SPEC AUTO: 9.04 X10(3)/MCL (ref 4–11.5)

## 2024-01-04 PROCEDURE — 3078F DIAST BP <80 MM HG: CPT | Mod: CPTII,,, | Performed by: NURSE PRACTITIONER

## 2024-01-04 PROCEDURE — 3074F SYST BP LT 130 MM HG: CPT | Mod: CPTII,,, | Performed by: NURSE PRACTITIONER

## 2024-01-04 PROCEDURE — 80061 LIPID PANEL: CPT | Performed by: NURSE PRACTITIONER

## 2024-01-04 PROCEDURE — 84443 ASSAY THYROID STIM HORMONE: CPT | Performed by: NURSE PRACTITIONER

## 2024-01-04 PROCEDURE — 87389 HIV-1 AG W/HIV-1&-2 AB AG IA: CPT | Performed by: NURSE PRACTITIONER

## 2024-01-04 PROCEDURE — 3008F BODY MASS INDEX DOCD: CPT | Mod: CPTII,,, | Performed by: NURSE PRACTITIONER

## 2024-01-04 PROCEDURE — 82306 VITAMIN D 25 HYDROXY: CPT | Performed by: NURSE PRACTITIONER

## 2024-01-04 PROCEDURE — 99213 OFFICE O/P EST LOW 20 MIN: CPT | Mod: ,,, | Performed by: NURSE PRACTITIONER

## 2024-01-04 PROCEDURE — 85025 COMPLETE CBC W/AUTO DIFF WBC: CPT | Performed by: NURSE PRACTITIONER

## 2024-01-04 PROCEDURE — 80053 COMPREHEN METABOLIC PANEL: CPT | Performed by: NURSE PRACTITIONER

## 2024-01-04 RX ORDER — FLUTICASONE PROPIONATE 50 MCG
1 SPRAY, SUSPENSION (ML) NASAL DAILY
Qty: 16 G | Refills: 2 | Status: SHIPPED | OUTPATIENT
Start: 2024-01-04

## 2024-01-04 RX ORDER — HYDROXYZINE PAMOATE 25 MG/1
25 CAPSULE ORAL NIGHTLY
COMMUNITY
End: 2024-01-04 | Stop reason: SDUPTHER

## 2024-01-04 RX ORDER — DEXTROAMPHETAMINE SULFATE, DEXTROAMPHETAMINE SACCHARATE, AMPHETAMINE SULFATE AND AMPHETAMINE ASPARTATE 7.5; 7.5; 7.5; 7.5 MG/1; MG/1; MG/1; MG/1
30 CAPSULE, EXTENDED RELEASE ORAL EVERY MORNING
Qty: 30 CAPSULE | Refills: 0 | Status: SHIPPED | OUTPATIENT
Start: 2024-01-04 | End: 2024-02-05 | Stop reason: SDUPTHER

## 2024-01-04 RX ORDER — HYDROXYZINE PAMOATE 25 MG/1
25 CAPSULE ORAL EVERY 8 HOURS PRN
Qty: 90 CAPSULE | Refills: 1 | Status: SHIPPED | OUTPATIENT
Start: 2024-01-04 | End: 2024-02-05 | Stop reason: SDUPTHER

## 2024-01-05 LAB
DEPRECATED CALCIDIOL+CALCIFEROL SERPL-MC: 13.2 NG/ML (ref 30–80)
HIV 1+2 AB+HIV1 P24 AG SERPL QL IA: NONREACTIVE

## 2024-01-17 ENCOUNTER — TELEPHONE (OUTPATIENT)
Dept: FAMILY MEDICINE | Facility: CLINIC | Age: 39
End: 2024-01-17
Payer: MEDICAID

## 2024-01-17 DIAGNOSIS — E55.9 VITAMIN D DEFICIENCY: Primary | ICD-10-CM

## 2024-01-17 RX ORDER — ERGOCALCIFEROL 1.25 MG/1
50000 CAPSULE ORAL
Qty: 8 CAPSULE | Refills: 2 | Status: SHIPPED | OUTPATIENT
Start: 2024-01-18 | End: 2024-05-23 | Stop reason: SDUPTHER

## 2024-01-17 NOTE — LETTER
January 17, 2024      [address] [address]  Phone: [phone]  Fax: [phone]       Patient: Melissa Young   YOB: 1985  Date of Visit: 01/17/2024    To Whom It May Concern:    Dania Young, we have been unable to contact you regarding your recent test results. Please contact our clinic at your convenience for those results.    Sincerely,       Nadine Gtz LPN

## 2024-01-17 NOTE — TELEPHONE ENCOUNTER
----- Message from Sadaf Chauhan NP sent at 1/11/2024  4:33 PM CST -----  Please notify patient of results.  Vit D low - vit D 50,000u twice weekly.  Everything else is good.

## 2024-02-05 DIAGNOSIS — T78.40XD ALLERGY, SUBSEQUENT ENCOUNTER: ICD-10-CM

## 2024-02-05 DIAGNOSIS — F90.9 ATTENTION DEFICIT HYPERACTIVITY DISORDER (ADHD), UNSPECIFIED ADHD TYPE: ICD-10-CM

## 2024-02-05 DIAGNOSIS — F41.9 ANXIETY: ICD-10-CM

## 2024-02-05 RX ORDER — HYDROXYZINE PAMOATE 25 MG/1
25 CAPSULE ORAL EVERY 8 HOURS PRN
Qty: 90 CAPSULE | Refills: 1 | Status: SHIPPED | OUTPATIENT
Start: 2024-02-05 | End: 2024-03-06 | Stop reason: SDUPTHER

## 2024-02-05 RX ORDER — BUSPIRONE HYDROCHLORIDE 5 MG/1
5 TABLET ORAL 2 TIMES DAILY
Qty: 60 TABLET | Refills: 2 | Status: SHIPPED | OUTPATIENT
Start: 2024-02-05 | End: 2024-03-06 | Stop reason: SDUPTHER

## 2024-02-05 RX ORDER — CETIRIZINE HYDROCHLORIDE 10 MG/1
10 TABLET ORAL NIGHTLY
Qty: 30 TABLET | Refills: 2 | Status: SHIPPED | OUTPATIENT
Start: 2024-02-05 | End: 2024-03-06 | Stop reason: SDUPTHER

## 2024-02-05 RX ORDER — DEXTROAMPHETAMINE SULFATE, DEXTROAMPHETAMINE SACCHARATE, AMPHETAMINE SULFATE AND AMPHETAMINE ASPARTATE 7.5; 7.5; 7.5; 7.5 MG/1; MG/1; MG/1; MG/1
30 CAPSULE, EXTENDED RELEASE ORAL EVERY MORNING
Qty: 30 CAPSULE | Refills: 0 | Status: SHIPPED | OUTPATIENT
Start: 2024-02-05 | End: 2024-03-07 | Stop reason: SDUPTHER

## 2024-02-05 NOTE — TELEPHONE ENCOUNTER
----- Message from Destiny Chauhan sent at 2/5/2024  3:05 PM CST -----  Regarding: Med refill  Needs a refill on Adderall 30mg,   Cetirizine 10mg,   Buspirone 5mg,   Hydroxyzine 25mg    [facility] [address]

## 2024-03-06 DIAGNOSIS — F90.9 ATTENTION DEFICIT HYPERACTIVITY DISORDER (ADHD), UNSPECIFIED ADHD TYPE: ICD-10-CM

## 2024-03-06 DIAGNOSIS — T78.40XD ALLERGY, SUBSEQUENT ENCOUNTER: ICD-10-CM

## 2024-03-06 DIAGNOSIS — F41.9 ANXIETY: ICD-10-CM

## 2024-03-06 RX ORDER — BUSPIRONE HYDROCHLORIDE 5 MG/1
5 TABLET ORAL 2 TIMES DAILY
Qty: 60 TABLET | Refills: 2 | Status: SHIPPED | OUTPATIENT
Start: 2024-03-06 | End: 2024-05-23 | Stop reason: SDUPTHER

## 2024-03-06 RX ORDER — CETIRIZINE HYDROCHLORIDE 10 MG/1
10 TABLET ORAL NIGHTLY
Qty: 30 TABLET | Refills: 2 | Status: SHIPPED | OUTPATIENT
Start: 2024-03-06 | End: 2024-05-23 | Stop reason: SDUPTHER

## 2024-03-06 RX ORDER — DEXTROAMPHETAMINE SULFATE, DEXTROAMPHETAMINE SACCHARATE, AMPHETAMINE SULFATE AND AMPHETAMINE ASPARTATE 7.5; 7.5; 7.5; 7.5 MG/1; MG/1; MG/1; MG/1
30 CAPSULE, EXTENDED RELEASE ORAL EVERY MORNING
Qty: 30 CAPSULE | Refills: 0 | Status: CANCELLED | OUTPATIENT
Start: 2024-03-06 | End: 2024-04-05

## 2024-03-06 RX ORDER — HYDROXYZINE PAMOATE 25 MG/1
25 CAPSULE ORAL EVERY 8 HOURS PRN
Qty: 90 CAPSULE | Refills: 1 | Status: SHIPPED | OUTPATIENT
Start: 2024-03-06 | End: 2024-05-23 | Stop reason: SDUPTHER

## 2024-03-06 NOTE — TELEPHONE ENCOUNTER
----- Message from Jennie Blake MA sent at 3/6/2024  4:40 PM CST -----  Sadaf     Adderall 30mg    Singulair 10mg    Hydroxizine 25mg    Buspirone 5mg      [facility] [address]

## 2024-03-07 DIAGNOSIS — F90.9 ATTENTION DEFICIT HYPERACTIVITY DISORDER (ADHD), UNSPECIFIED ADHD TYPE: ICD-10-CM

## 2024-03-07 RX ORDER — DEXTROAMPHETAMINE SULFATE, DEXTROAMPHETAMINE SACCHARATE, AMPHETAMINE SULFATE AND AMPHETAMINE ASPARTATE 7.5; 7.5; 7.5; 7.5 MG/1; MG/1; MG/1; MG/1
30 CAPSULE, EXTENDED RELEASE ORAL EVERY MORNING
Qty: 30 CAPSULE | Refills: 0 | Status: SHIPPED | OUTPATIENT
Start: 2024-03-07 | End: 2024-04-15

## 2024-03-07 NOTE — TELEPHONE ENCOUNTER
----- Message from Destiny Chauhan sent at 3/7/2024 11:34 AM CST -----  Regarding: Resend med  [facility] states they did not get the refill request for her Adderall XR 30mg  Please resend

## 2024-04-10 NOTE — PROGRESS NOTES
"SUBJECTIVE:  Melissa Young is a 38 y.o. female here alone for ADHD.    HPI  Patient presents for 3 month follow up on chronic conditions with medication refills. No concerns or problems voiced at this time. Ordered referral to [facility] for cervical cancer screening.    Kamis allergies, medications, history, and problem list were updated as appropriate.    Review of Systems   A comprehensive review of symptoms was completed and negative except as noted above.    OBJECTIVE:  Vital signs  Vitals:    04/11/24 0942   BP: 124/74   BP Location: Right arm   Patient Position: Sitting   Pulse: 86   Resp: 18   Temp: 97.1 °F (36.2 °C)   TempSrc: Temporal   SpO2: 99%   Weight: 83.9 kg (185 lb)   Height: 5' 2" (1.575 m)        Physical Exam  Constitutional:       Appearance: Normal appearance.   HENT:      Head: Normocephalic and atraumatic.      Right Ear: Tympanic membrane, ear canal and external ear normal.      Left Ear: Tympanic membrane, ear canal and external ear normal.      Nose: Nose normal.      Mouth/Throat:      Mouth: Mucous membranes are moist.      Pharynx: Oropharynx is clear.   Eyes:      Conjunctiva/sclera: Conjunctivae normal.   Cardiovascular:      Rate and Rhythm: Normal rate and regular rhythm.   Pulmonary:      Effort: Pulmonary effort is normal.      Breath sounds: Normal breath sounds.   Abdominal:      General: Bowel sounds are normal.      Palpations: Abdomen is soft.   Musculoskeletal:         General: Normal range of motion.      Cervical back: Normal range of motion and neck supple.   Skin:     General: Skin is warm.      Capillary Refill: Capillary refill takes less than 2 seconds.   Neurological:      Mental Status: She is alert.   Psychiatric:         Mood and Affect: Mood normal.         Behavior: Behavior normal.         Judgment: Judgment normal.          ASSESSMENT/PLAN:    1. Attention deficit hyperactivity disorder (ADHD), unspecified ADHD type  Comments:  stable on Adderal XR 30 " mg    2. Anxiety  Comments:  stable    3. Women's annual routine gynecological examination  Comments:  due for annual - will refer  Orders:  -     Ambulatory referral/consult to Gynecology; Future; Expected date: 04/18/2024    4. Vitamin D deficiency  Comments:  taking supplements, repeat level with next visit          No results found for this or any previous visit (from the past 24 hour(s)).    Follow Up:  Follow up in about 3 months (around 7/11/2024).      Discussed the diagnosis, prognosis, plan of care, chronic nature of and indications for, risks and benefits of treatment for conditions.  Continue all medications as prescribed unless otherwise noted.   Call if patient develops other symptoms or if not better in 2-3 days and sooner if worse. Emphasized the importance of compliance with all recommendations, including medication use and timely follow up. Instructed to return as noted be or sooner if patient develops any other problems or if there are any other additional questions or concerns.

## 2024-04-11 ENCOUNTER — OFFICE VISIT (OUTPATIENT)
Dept: FAMILY MEDICINE | Facility: CLINIC | Age: 39
End: 2024-04-11
Payer: MEDICAID

## 2024-04-11 VITALS
HEART RATE: 86 BPM | TEMPERATURE: 97 F | OXYGEN SATURATION: 99 % | SYSTOLIC BLOOD PRESSURE: 124 MMHG | BODY MASS INDEX: 34.04 KG/M2 | WEIGHT: 185 LBS | DIASTOLIC BLOOD PRESSURE: 74 MMHG | RESPIRATION RATE: 18 BRPM | HEIGHT: 62 IN

## 2024-04-11 DIAGNOSIS — F41.9 ANXIETY: ICD-10-CM

## 2024-04-11 DIAGNOSIS — E55.9 VITAMIN D DEFICIENCY: ICD-10-CM

## 2024-04-11 DIAGNOSIS — F90.9 ATTENTION DEFICIT HYPERACTIVITY DISORDER (ADHD), UNSPECIFIED ADHD TYPE: Primary | ICD-10-CM

## 2024-04-11 DIAGNOSIS — Z01.419 WOMEN'S ANNUAL ROUTINE GYNECOLOGICAL EXAMINATION: ICD-10-CM

## 2024-04-11 PROCEDURE — 99214 OFFICE O/P EST MOD 30 MIN: CPT | Mod: ,,, | Performed by: NURSE PRACTITIONER

## 2024-04-11 PROCEDURE — 3078F DIAST BP <80 MM HG: CPT | Mod: CPTII,,, | Performed by: NURSE PRACTITIONER

## 2024-04-11 PROCEDURE — 1159F MED LIST DOCD IN RCRD: CPT | Mod: CPTII,,, | Performed by: NURSE PRACTITIONER

## 2024-04-11 PROCEDURE — 3074F SYST BP LT 130 MM HG: CPT | Mod: CPTII,,, | Performed by: NURSE PRACTITIONER

## 2024-04-11 PROCEDURE — 3008F BODY MASS INDEX DOCD: CPT | Mod: CPTII,,, | Performed by: NURSE PRACTITIONER

## 2024-04-15 DIAGNOSIS — J30.89 ENVIRONMENTAL AND SEASONAL ALLERGIES: ICD-10-CM

## 2024-04-15 DIAGNOSIS — F90.9 ATTENTION DEFICIT HYPERACTIVITY DISORDER (ADHD), UNSPECIFIED ADHD TYPE: ICD-10-CM

## 2024-04-15 RX ORDER — FLUTICASONE PROPIONATE 50 MCG
SPRAY, SUSPENSION (ML) NASAL
Qty: 16 G | Refills: 2 | Status: SHIPPED | OUTPATIENT
Start: 2024-04-15 | End: 2024-05-23 | Stop reason: SDUPTHER

## 2024-04-15 RX ORDER — DEXTROAMPHETAMINE SULFATE, DEXTROAMPHETAMINE SACCHARATE, AMPHETAMINE SULFATE AND AMPHETAMINE ASPARTATE 7.5; 7.5; 7.5; 7.5 MG/1; MG/1; MG/1; MG/1
CAPSULE, EXTENDED RELEASE ORAL
Qty: 30 CAPSULE | Refills: 0 | Status: SHIPPED | OUTPATIENT
Start: 2024-04-15 | End: 2024-05-23 | Stop reason: SDUPTHER

## 2024-05-23 DIAGNOSIS — E55.9 VITAMIN D DEFICIENCY: ICD-10-CM

## 2024-05-23 DIAGNOSIS — T78.40XD ALLERGY, SUBSEQUENT ENCOUNTER: ICD-10-CM

## 2024-05-23 DIAGNOSIS — F90.9 ATTENTION DEFICIT HYPERACTIVITY DISORDER (ADHD), UNSPECIFIED ADHD TYPE: ICD-10-CM

## 2024-05-23 DIAGNOSIS — F41.9 ANXIETY: ICD-10-CM

## 2024-05-23 DIAGNOSIS — J30.89 ENVIRONMENTAL AND SEASONAL ALLERGIES: ICD-10-CM

## 2024-05-23 RX ORDER — DEXTROAMPHETAMINE SULFATE, DEXTROAMPHETAMINE SACCHARATE, AMPHETAMINE SULFATE AND AMPHETAMINE ASPARTATE 7.5; 7.5; 7.5; 7.5 MG/1; MG/1; MG/1; MG/1
30 CAPSULE, EXTENDED RELEASE ORAL DAILY
Qty: 30 CAPSULE | Refills: 0 | Status: SHIPPED | OUTPATIENT
Start: 2024-05-23

## 2024-05-23 RX ORDER — CETIRIZINE HYDROCHLORIDE 10 MG/1
10 TABLET ORAL NIGHTLY
Qty: 30 TABLET | Refills: 2 | Status: SHIPPED | OUTPATIENT
Start: 2024-05-23 | End: 2024-08-21

## 2024-05-23 RX ORDER — HYDROXYZINE PAMOATE 25 MG/1
25 CAPSULE ORAL EVERY 8 HOURS PRN
Qty: 90 CAPSULE | Refills: 1 | Status: SHIPPED | OUTPATIENT
Start: 2024-05-23

## 2024-05-23 RX ORDER — ERGOCALCIFEROL 1.25 MG/1
50000 CAPSULE ORAL
Qty: 8 CAPSULE | Refills: 2 | Status: SHIPPED | OUTPATIENT
Start: 2024-05-23

## 2024-05-23 RX ORDER — FLUTICASONE PROPIONATE 50 MCG
1 SPRAY, SUSPENSION (ML) NASAL DAILY
Qty: 16 G | Refills: 2 | Status: SHIPPED | OUTPATIENT
Start: 2024-05-23

## 2024-05-23 RX ORDER — BUSPIRONE HYDROCHLORIDE 5 MG/1
5 TABLET ORAL 2 TIMES DAILY
Qty: 60 TABLET | Refills: 2 | Status: SHIPPED | OUTPATIENT
Start: 2024-05-23 | End: 2024-08-21

## 2024-05-23 NOTE — TELEPHONE ENCOUNTER
----- Message from Destiny Chauhan sent at 5/23/2024 10:07 AM CDT -----  Regarding: Med refill  Needs a refill on;  AdderallXR  30mg  Fluticason  50mcg  Buspirone  5mg  Cetirizine  10mg  Hydroxyzine  25mg    [facility]

## 2024-06-27 DIAGNOSIS — F90.9 ATTENTION DEFICIT HYPERACTIVITY DISORDER (ADHD), UNSPECIFIED ADHD TYPE: ICD-10-CM

## 2024-06-27 RX ORDER — DEXTROAMPHETAMINE SULFATE, DEXTROAMPHETAMINE SACCHARATE, AMPHETAMINE SULFATE AND AMPHETAMINE ASPARTATE 7.5; 7.5; 7.5; 7.5 MG/1; MG/1; MG/1; MG/1
30 CAPSULE, EXTENDED RELEASE ORAL DAILY
Qty: 30 CAPSULE | Refills: 0 | Status: SHIPPED | OUTPATIENT
Start: 2024-06-27

## 2024-06-27 NOTE — TELEPHONE ENCOUNTER
----- Message from Leena Quick sent at 6/27/2024 10:32 AM CDT -----  Patient needs refill on Adderall 30 mg XR called in to [facility]